# Patient Record
(demographics unavailable — no encounter records)

---

## 2024-11-29 NOTE — ASSESSMENT
[FreeTextEntry1] : Alert, oriented, well, pleasant.  Mild scaling scalp. No erythema. No lymphadenopathy. Has not used ketoconazole shampoo yet. Seborrheic dermatitis. Use shampoo as directed. Leave on 5 minutes twice per week.  0.2cm erythematous follicular papule left upper back.  Folliculitis. Wash with soap and water.  0.4cm yellow brown papule right lower eyelid. Seborrheic keratosis. No treatment.  Stasis dermatitis. Decrease erythema and edema. On stockings. Continue.

## 2024-11-29 NOTE — HISTORY OF PRESENT ILLNESS
[FreeTextEntry1] : dark spot right lower eyelid for months. Bump on back 1 week. Told has scalp fungus, given Ketoconazole shampoo. Not used. [de-identified] : f/u stasis dermatitis. h/o cutaneous malignancies including melanoma. With osorio Valdes

## 2025-01-29 NOTE — ASSESSMENT
[FreeTextEntry1] : Alert, oriented, well, pleasant.  2+ pitting edema of legs. Pebbly on left. 1 crust right lateral lower leg. Dry. Stasis dermatitis controlled. No erythema or scale.  Edema chronic, lymphedema. Consider seeing vascular surgeon.

## 2025-01-29 NOTE — HISTORY OF PRESENT ILLNESS
[FreeTextEntry1] : Legs not getting better. Still swollen. Using creams for skin. On diuretic. Wears support stockings. Does leg exercises. [de-identified] : personal history melanoma and bcc.

## 2025-03-18 NOTE — ASSESSMENT
[FreeTextEntry1] : Alert, oriented, well, pleasant.  less erythema than January. 2 + pitting edema left leg. erosion moist right lateral lower leg. No pain, edema, evidence infection. Has not seen vascular.  Stasis dermatitis. Leg edema Consider seeing Dr Benson. Elevation when sitting. Not exercising, difficulty walking. ? compression boots.

## 2025-03-18 NOTE — HISTORY OF PRESENT ILLNESS
[FreeTextEntry1] : "Is it infected?" [de-identified] : band aid on oozing area right lateral lower leg. with wife

## 2025-06-04 NOTE — HISTORY OF PRESENT ILLNESS
[FreeTextEntry1] : Concerned a spot on right leg for months. States picks at it. Rubbed by support stockings. [de-identified] : Personal hx of skin cancer.

## 2025-06-04 NOTE — ASSESSMENT
[FreeTextEntry1] : Alert, oriented, well, pleasant.  keratotic 6mm papule with central dark crust, hemorrhagic. Picked off. Solar keratosis. Also 7mm scaly papule right dorsum hand. 2 Solar Keratosis.  Informed consent given. Side effects discussed. Cryotherapy. Tolerated well. Wound care instructed.  crusted yellow brown papule right chest. Picks. Inflamed Seborrheic keratosis. Informed consent given. Side effects discussed. Cryotherapy. Tolerated well. Wound care instructed.